# Patient Record
Sex: FEMALE | Race: AMERICAN INDIAN OR ALASKA NATIVE
[De-identification: names, ages, dates, MRNs, and addresses within clinical notes are randomized per-mention and may not be internally consistent; named-entity substitution may affect disease eponyms.]

---

## 2018-01-12 ENCOUNTER — HOSPITAL ENCOUNTER (EMERGENCY)
Dept: HOSPITAL 42 - ED | Age: 23
Discharge: HOME | End: 2018-01-12
Payer: MEDICAID

## 2018-01-12 VITALS — TEMPERATURE: 99 F

## 2018-01-12 VITALS — BODY MASS INDEX: 23.7 KG/M2

## 2018-01-12 DIAGNOSIS — N64.4: ICD-10-CM

## 2018-01-12 DIAGNOSIS — F17.210: ICD-10-CM

## 2018-01-12 DIAGNOSIS — J40: Primary | ICD-10-CM

## 2018-01-12 LAB
ALBUMIN SERPL-MCNC: 4.2 G/DL (ref 3–4.8)
ALBUMIN/GLOB SERPL: 1.2 {RATIO} (ref 1.1–1.8)
ALT SERPL-CCNC: 23 U/L (ref 7–56)
APPEARANCE UR: CLEAR
AST SERPL-CCNC: 21 U/L (ref 14–36)
BASOPHILS # BLD AUTO: 0.06 K/MM3 (ref 0–2)
BASOPHILS NFR BLD: 0.6 % (ref 0–3)
BILIRUB UR-MCNC: NEGATIVE MG/DL
BNP SERPL-MCNC: 47.2 PG/ML (ref 0–450)
BUN SERPL-MCNC: 9 MG/DL (ref 7–21)
CALCIUM SERPL-MCNC: 9.3 MG/DL (ref 8.4–10.5)
COLOR UR: YELLOW
EOSINOPHIL # BLD: 0.4 10*3/UL (ref 0–0.7)
EOSINOPHIL NFR BLD: 3.8 % (ref 1.5–5)
ERYTHROCYTE [DISTWIDTH] IN BLOOD BY AUTOMATED COUNT: 13.6 % (ref 11.5–14.5)
GFR NON-AFRICAN AMERICAN: > 60
GLUCOSE UR STRIP-MCNC: NEGATIVE MG/DL
GRANULOCYTES # BLD: 5.8 10*3/UL (ref 1.4–6.5)
GRANULOCYTES NFR BLD: 55.6 % (ref 50–68)
HCG,QUALITATIVE URINE: NEGATIVE
HGB BLD-MCNC: 12.5 G/DL (ref 12–16)
LEUKOCYTE ESTERASE UR-ACNC: NEGATIVE LEU/UL
LYMPHOCYTES # BLD: 3.6 10*3/UL (ref 1.2–3.4)
LYMPHOCYTES NFR BLD AUTO: 34.3 % (ref 22–35)
MAGNESIUM SERPL-MCNC: 1.8 MG/DL (ref 1.7–2.2)
MCH RBC QN AUTO: 29.6 PG (ref 25–35)
MCHC RBC AUTO-ENTMCNC: 34.6 G/DL (ref 31–37)
MCV RBC AUTO: 85.5 FL (ref 80–105)
MONOCYTES # BLD AUTO: 0.6 10*3/UL (ref 0.1–0.6)
MONOCYTES NFR BLD: 5.7 % (ref 1–6)
PH UR STRIP: 6 [PH] (ref 4.7–8)
PLATELET # BLD: 190 10^3/UL (ref 120–450)
PMV BLD AUTO: 11.3 FL (ref 7–11)
PROT UR STRIP-MCNC: NEGATIVE MG/DL
RBC # BLD AUTO: 4.22 10^6/UL (ref 3.5–6.1)
RBC # UR STRIP: NEGATIVE /UL
SP GR UR STRIP: 1.02 (ref 1–1.03)
T4 FREE SERPL-MCNC: 0.87 NG/DL (ref 0.78–2.19)
TROPONIN I SERPL-MCNC: < 0.01 NG/ML
URINE NITRATE: NEGATIVE
UROBILINOGEN UR STRIP-ACNC: 0.2 E.U./DL
WBC # BLD AUTO: 10.4 10^3/UL (ref 4.5–11)

## 2018-01-12 PROCEDURE — 99283 EMERGENCY DEPT VISIT LOW MDM: CPT

## 2018-01-12 PROCEDURE — 83615 LACTATE (LD) (LDH) ENZYME: CPT

## 2018-01-12 PROCEDURE — 71275 CT ANGIOGRAPHY CHEST: CPT

## 2018-01-12 PROCEDURE — 71045 X-RAY EXAM CHEST 1 VIEW: CPT

## 2018-01-12 PROCEDURE — 81003 URINALYSIS AUTO W/O SCOPE: CPT

## 2018-01-12 PROCEDURE — 83735 ASSAY OF MAGNESIUM: CPT

## 2018-01-12 PROCEDURE — 85025 COMPLETE CBC W/AUTO DIFF WBC: CPT

## 2018-01-12 PROCEDURE — 96374 THER/PROPH/DIAG INJ IV PUSH: CPT

## 2018-01-12 PROCEDURE — 84703 CHORIONIC GONADOTROPIN ASSAY: CPT

## 2018-01-12 PROCEDURE — 80053 COMPREHEN METABOLIC PANEL: CPT

## 2018-01-12 PROCEDURE — 84439 ASSAY OF FREE THYROXINE: CPT

## 2018-01-12 PROCEDURE — 84484 ASSAY OF TROPONIN QUANT: CPT

## 2018-01-12 PROCEDURE — 84443 ASSAY THYROID STIM HORMONE: CPT

## 2018-01-12 PROCEDURE — 82550 ASSAY OF CK (CPK): CPT

## 2018-01-12 PROCEDURE — 83880 ASSAY OF NATRIURETIC PEPTIDE: CPT

## 2018-01-12 PROCEDURE — 85378 FIBRIN DEGRADE SEMIQUANT: CPT

## 2018-01-12 NOTE — ED PDOC
Arrival/HPI





- General


Time Seen by Provider: 01/12/18 17:25


Historian: Patient





- History of Present Illness


Narrative History of Present Illness (Text): 





01/12/18 17:25


23 y/o female, pmh including costchondritis, allergic to contrast, c/o 

shortness of breath for over 2 years with bilateral breast pain over 2 months 

with no fall or trauma.  Shortness of breath associated with the difficulty 

getting the air in and out as per patient, no history diagnose with the asthma/

copd, non-exertional shortness of breath as per patient.  Pt. stated that she 

has bilateral breast pain for the past 3 months, painful to touch, no nipple 

discharge, no night sweat, no rash, no numbness or tingling, no fatigue, no 

fever or chills, no other medical or psychological complaints. 





Past Medical History





- Provider Review


Nursing Documentation Reviewed: Yes





- Infectious Disease


Hx of Infectious Diseases: None





- Pulmonary


Hx Asthma: Yes





- Psychiatric


Hx Substance Use: Yes





- Anesthesia


Hx Anesthesia: No





Family/Social History





- Physician Review


Nursing Documentation Reviewed: Yes


Family/Social History: Unknown Family HX


Smoking Status: Light Smoker < 10 Cigarettes Daily


Hx Alcohol Use: Yes


Hx Substance Use: Yes


Substance used: marijuana





Allergies/Home Meds


Allergies/Adverse Reactions: 


Allergies





shellfish derived Allergy (Verified 01/12/18 19:21)


 RASH











Review of Systems





- Review of Systems


Constitutional: absent: Fatigue, Fevers


Eyes: absent: Vision Changes


ENT: absent: Hearing Changes


Respiratory: SOB.  absent: Cough, Sputum


Cardiovascular: absent: Chest Pain


Gastrointestinal: absent: Abdominal Pain, Diarrhea, Nausea, Vomiting


Musculoskeletal: Other (+breast pain bilaterally)


Skin: absent: Rash, Pruritis


Neurological: absent: Headache, Dizziness


Psychiatric: absent: Anxiety, Depression





Physical Exam


Vital Signs Reviewed: Yes


Vital Signs











  Temp Pulse Resp BP Pulse Ox


 


 01/12/18 18:05  99.0 F  79  19   99


 


 01/12/18 17:24  99 F  70  16  108/67  98











Temperature: Afebrile


Blood Pressure: Normal


Pulse: Regular


Respiratory Rate: Normal


Appearance: Positive for: Well-Appearing, Non-Toxic, Comfortable


Pain Distress: Moderate


Mental Status: Positive for: Alert and Oriented X 3





- Systems Exam


Head: Present: Atraumatic, Normocephalic


Pupils: Present: PERRL


Extroacular Muscles: Present: EOMI


Conjunctiva: Present: Normal


Mouth: Present: Moist Mucous Membranes


Neck: Present: Normal Range of Motion


Respiratory/Chest: Present: Clear to Auscultation, Good Air Exchange.  No: 

Respiratory Distress, Accessory Muscle Use, Wheezes, Decreased Breath Sounds, 

Rales, Retracting, Rhonchi, Tachypneic, Tender to Palpation


Cardiovascular: Present: Regular Rate and Rhythm, Normal S1, S2.  No: Murmurs


Abdomen: Present: Normal Bowel Sounds.  No: Tenderness, Distention, Peritoneal 

Signs


Breast/Axillary: Present: Other (Female chaperone: ER JUDI garcia, pain is 100

% reproducible by palpating it bilaterally. ), Symmetrical.  No: Axillary 

Lymphad, Discoloration, Erythema, Fluctuance, Masses, Nipple Discharge, Swelling

, Tender to Palpation


Back: Present: Normal Inspection


Upper Extremity: Present: Normal Inspection.  No: Cyanosis, Edema


Lower Extremity: Present: Normal Inspection.  No: Edema


Neurological: Present: GCS=15, Speech Normal, Motor Func Grossly Intact, Gait 

Normal, Memory Normal


Skin: Present: Warm, Dry, Normal Color.  No: Rashes


Psychiatric: Present: Alert, Oriented x 3, Normal Insight, Normal Concentration





Medical Decision Making


ED Course and Treatment: 





01/12/18 18:06


-labs/dimer


-ekg


-cxr


-IVF/toradol


-observe and reassess





01/12/18 20:31


-Labs are non-significant except d-dimer 350, CTA ordered


-Thyrdoid profile is negative


-BNP and troponin is negative


-Chest xray show no active disease


-CTA show No acute vascular findings. Mild nonspecific peribronchial 

thickening. Consider upper respiratory infection.


-I discussed with the patient that she should have outpatient sonogram of the 

both breast and need follow up as ideal situation.


-There is no PE noted on the CTA, pt. is asymptomatic now, will discharge home.


-Discharge home with zithromax, naproxen, zyrtec, prednisone, albuterol MDI, 

bed rest, follow up with your own pmd and obgyn/pulmonologist for outpatient 

breast sonogram follow up, return to the ER for any new or worsening signs or 

symptoms. 





- Lab Interpretations


Lab Results: 








 01/12/18 18:35 





 01/12/18 18:35 





 Lab Results





01/12/18 18:35: Urine Color Yellow, Urine Appearance Clear, Urine pH 6.0, Ur 

Specific Gravity 1.020, Urine Protein Negative, Urine Glucose (UA) Negative, 

Urine Ketones Negative, Urine Blood Negative, Urine Nitrate Negative, Urine 

Bilirubin Negative, Urine Urobilinogen 0.2, Ur Leukocyte Esterase Negative, 

Urine HCG, Qual Negative


01/12/18 18:35: WBC 10.4, RBC 4.22, Hgb 12.5, Hct 36.1, MCV 85.5, MCH 29.6, 

MCHC 34.6, RDW 13.6, Plt Count 190, MPV 11.3 H, Gran % 55.6, Lymph % (Auto) 34.3

, Mono % (Auto) 5.7, Eos % (Auto) 3.8, Baso % (Auto) 0.6, Gran # 5.80, Lymph # 

3.6 H, Mono # 0.6, Eos # 0.4, Baso # 0.06


01/12/18 18:35: Free T4 0.87, TSH 3rd Generation 1.98


01/12/18 18:35: Sodium 141, Potassium 4.3, Chloride 105, Carbon Dioxide 26, 

Anion Gap 14, BUN 9, Creatinine 0.6 L, Est GFR ( Amer) > 60, Est GFR (Non

-Af Amer) > 60, Random Glucose 94, Calcium 9.3, Magnesium 1.8, Total Bilirubin 

1.0, AST 21, ALT 23, Alkaline Phosphatase 35 L, Lactate Dehydrogenase 303 L, 

Total Creatine Kinase 72, Troponin I < 0.01, NT-Pro-B Natriuret Pep 47.2, Total 

Protein 7.7, Albumin 4.2, Globulin 3.5, Albumin/Globulin Ratio 1.2


01/12/18 18:35: D-Dimer, Quantitative 350 H








I have reviewed the lab results: Yes





- RAD Interpretation


Radiology Orders: 








01/12/18 18:08


CHEST PORTABLE [RAD] Stat 





01/12/18 19:14


ANGIO CHEST PE PROTOCOL [CT] Stat 











Chest xray:


--------------------------------------------------------------------------------

---------------


CTA Chest:





FINDINGS:


Limitations: Mild respiratory motion artifact is present.


Pulmonary arteries: Unremarkable. No pulmonary embolism.


Aorta: No acute findings. No thoracic aortic aneurysm.


Lungs: Mild peribronchial thickening. No mass. No consolidation.


Pleural space: Unremarkable. No significant effusion. No pneumothorax.


Heart: No cardiomegaly. No significant pericardial effusion.


Bones/joints: No acute fracture. No dislocation.


Soft tissues: Unremarkable.


Lymph nodes: Unremarkable. No enlarged lymph nodes.


IMPRESSION:


No acute vascular findings.


Mild nonspecific peribronchial thickening. Consider upper respiratory infection.





Thank you for allowing us to participate in the care of your patient.


Dictated and Authenticated by: Nain Davis MD


01/12/2018 8:23 PM Eastern Time ([place] & [place])


: Radiologist





- Medication Orders


Current Medication Orders: 











Discontinued Medications





Sodium Chloride (Sodium Chloride 0.9%)  1,000 mls @ 999 mls/hr IV .Q1H1M STA


   Stop: 01/12/18 19:01


   Last Admin: 01/12/18 19:08  Dose: 999 mls/hr





eMAR Start Stop


 Document     01/12/18 19:08  [email or web address]  (Rec: 01/12/18 19:08  [email or web address]  [id number])


     Intravenous Solution


      Start Date                                 01/12/18


      Start Time                                 19:08


      End Date                                   01/12/18





Ketorolac Tromethamine (Toradol)  30 mg IVP STAT STA


   Stop: 01/12/18 18:02


   Last Admin: 01/12/18 19:08  Dose: 30 mg





MAR Pain Assessment


 Document     01/12/18 19:08  [email or web address]  (Rec: 01/12/18 19:08  CASTS1  [id number])


     Pain Reassessment


      Is this a pain reassessment?               No


     Sleep


      Is patient sleeping during reassessment?   No


     Presence of Pain


      Presence of Pain                           Yes


     Pain Scale Used


      Pain Scale Used                            Numeric


     Location


      Pain Location Body Site                    Breast


     Description


      Description                                Constant


      Intensity of Pain at present               7


      Pain Behavior                              Facial Grimacing


      Aggravating Factors                        Changing Position


      Alleviating Factors/Management             Medication


       Techniques                                


      Alleviating Factors                        Medication


IVP Administration


 Document     01/12/18 19:08  [email or web address]  (Rec: 01/12/18 19:08  CASTS1  [id number])


     Charges for Administration


      # of IVP Administrations                   1














- PA / NP / Resident Statement


MD/DO has reviewed & agrees with the documentation as recorded.





Disposition/Present on Arrival





- Present on Arrival


Any Indicators Present on Arrival: No


History of DVT/PE: No


History of Uncontrolled Diabetes: No


Urinary Catheter: No


History of Decub. Ulcer: No


History Surgical Site Infection Following: None





- Disposition


Have Diagnosis and Disposition been Completed?: Yes


Diagnosis: 


 Breast pain, Bronchitis





Disposition: HOME/ ROUTINE


Disposition Time: 20:33


Patient Plan: Discharge


Condition: GOOD


Discharge Instructions (ExitCare):  Chest Pain (ED)


Print Language: ENGLISH


Additional Instructions: 


-Discharge home with zithromax, naproxen, zyrtec, prednisone, albuterol MDI, 

bed rest, follow up with your own pmd and obgyn/pulmonologist for outpatient 

breast sonogram follow up, return to the ER for any new or worsening signs or 

symptoms. 


Prescriptions: 


Albuterol HFA [Ventolin HFA 90 mcg/actuation (8 g)] 2 puff IH A8VAMTS PRN #1 

inhaler


 PRN Reason: Cough


Azithromycin [Zithromax] 250 mg PO DAILY #6 tab


Cetirizine HCl [Zyrtec Allergy] 10 mg PO DAILY #10 sgl


Naproxen 500 mg PO BID PRN #24 tab


 PRN Reason: Other


Prednisone 50 mg PO DAILY #5 tab


Referrals: 


Rimma Flores MD [Primary Care Provider] - Follow up with primary


Александр Lira MD [Staff Provider] - Follow up with primary


Amy Rider MD [Staff Provider] - Follow up with primary


Forms:  WORK NOTE

## 2018-01-13 VITALS
DIASTOLIC BLOOD PRESSURE: 65 MMHG | OXYGEN SATURATION: 100 % | SYSTOLIC BLOOD PRESSURE: 110 MMHG | RESPIRATION RATE: 18 BRPM | HEART RATE: 75 BPM

## 2018-01-13 NOTE — CT
PROCEDURE:  CT Chest with contrast (Pulmonary Angiogram)



HISTORY:

elevated d-dimer, shortness of breath x 2 years



COMPARISON:

None available. 



TECHNIQUE:

Axial computed tomography images were obtained of the chest in the 

pulmonary arterial phase of enhancement. Coronal and sagittal 

reformatted images were created and reviewed.



Intravenous contrast dose: 100 cc of Omni 350



Radiation dose:



Total exam DLP = 216 mGy-cm.



This CT exam was performed using one or more of the following dose 

reduction techniques: Automated exposure control, adjustment of the 

mA and/or kV according to patient size, and/or use of iterative 

reconstruction technique.



FINDINGS:



PULMONARY ARTERIES:

Unremarkable. No pulmonary embolism. 



AORTA:

No acute findings. No thoracic aortic aneurysm. 



LUNGS:

Unremarkable. No nodule, mass or pulmonary consolidation. 



PLEURAL SPACES:

Unremarkable. No effusion or pneuomothorax. 



HEART:

Unremarkable. No cardiomegaly. No significant pericardial effusion. 



LYMPH NODES:

No lymphadenopathy.



BONES, CHEST WALL:

Unremarkable. No fracture or destructive lesion 



OTHER FINDINGS:

Unremarkable. 



IMPRESSION:

Unremarkable CT pulmonary angiogram. No pulmonary embolus.

## 2018-12-03 ENCOUNTER — HOSPITAL ENCOUNTER (EMERGENCY)
Dept: HOSPITAL 42 - ED | Age: 23
Discharge: HOME | End: 2018-12-03
Payer: MEDICAID

## 2018-12-03 VITALS
HEART RATE: 68 BPM | DIASTOLIC BLOOD PRESSURE: 60 MMHG | RESPIRATION RATE: 17 BRPM | SYSTOLIC BLOOD PRESSURE: 120 MMHG | OXYGEN SATURATION: 99 %

## 2018-12-03 VITALS — TEMPERATURE: 99.1 F

## 2018-12-03 VITALS — BODY MASS INDEX: 23.9 KG/M2
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
## 2018-12-03 DIAGNOSIS — S89.90XA: Primary | ICD-10-CM

## 2018-12-03 DIAGNOSIS — S60.041A: ICD-10-CM

## 2018-12-03 DIAGNOSIS — M79.18: ICD-10-CM

## 2018-12-03 DIAGNOSIS — F17.210: ICD-10-CM
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
## 2018-12-03 NOTE — RAD
Date of service: 



12/03/2018



PROCEDURE:  Bilateral Knee Radiographs.



HISTORY:

injury



COMPARISON:

None.



FINDINGS:



BONES:

Right Knee:  Normal. No fracture. 



Left Knee:  Normal. No fracture. 



JOINTS:

Right Knee:  Normal. No osteoarthritis. 



Left knee: Normal. No osteoarthritis. 



SOFT TISSUES:

Right Knee: Normal.



Left Knee: Normal.



JOINT EFFUSION:

Right Knee: None. 



Left Knee: None.



OTHER FINDINGS:

None.



IMPRESSION:

Normal radiographs of the knees.

## 2018-12-03 NOTE — RAD
PROCEDURE:  Right Hand and 4th digit radiographs.



HISTORY:

injury



COMPARISON:

None.



FINDINGS:



BONES:

Normal. No fracture. 



JOINTS:

Normal. No osteoarthritic changes. 



SOFT TISSUES:

Normal. 



OTHER FINDINGS:

None.



IMPRESSION:

Negative study